# Patient Record
Sex: MALE | Race: WHITE | NOT HISPANIC OR LATINO | Employment: STUDENT | URBAN - METROPOLITAN AREA
[De-identification: names, ages, dates, MRNs, and addresses within clinical notes are randomized per-mention and may not be internally consistent; named-entity substitution may affect disease eponyms.]

---
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
## 2024-09-12 ENCOUNTER — OFFICE VISIT (OUTPATIENT)
Dept: FAMILY MEDICINE | Facility: CLINIC | Age: 24
End: 2024-09-12
Payer: COMMERCIAL

## 2024-09-12 VITALS
RESPIRATION RATE: 22 BRPM | DIASTOLIC BLOOD PRESSURE: 62 MMHG | BODY MASS INDEX: 26.24 KG/M2 | WEIGHT: 211 LBS | HEIGHT: 75 IN | TEMPERATURE: 96.8 F | SYSTOLIC BLOOD PRESSURE: 116 MMHG | HEART RATE: 78 BPM | OXYGEN SATURATION: 98 %

## 2024-09-12 DIAGNOSIS — Z11.4 SCREENING FOR HIV (HUMAN IMMUNODEFICIENCY VIRUS): Primary | ICD-10-CM

## 2024-09-12 DIAGNOSIS — Z00.00 ENCOUNTER FOR ANNUAL PHYSICAL EXAM: ICD-10-CM

## 2024-09-12 DIAGNOSIS — Z23 ENCOUNTER FOR IMMUNIZATION: ICD-10-CM

## 2024-09-12 PROCEDURE — 90471 IMMUNIZATION ADMIN: CPT

## 2024-09-12 PROCEDURE — 99385 PREV VISIT NEW AGE 18-39: CPT | Mod: 25

## 2024-09-12 PROCEDURE — 90656 IIV3 VACC NO PRSV 0.5 ML IM: CPT

## 2024-09-12 NOTE — PROGRESS NOTES
Preventive Care Visit  [facility]  Bautista Alarcon, SAMIRA, Nurse Practitioner Primary Care  Sep 12, 2024      Assessment & Plan     Encounter for immunization  - INFLUENZA VACCINE,SPLIT VIRUS,TRIVALENT,PF(FLUZONE)    Encounter for annual physical exam    Patient has been advised of split billing requirements and indicates understanding: Yes        Subjective   Yusef is a 24 year old, presenting for the following:  Physical  Graduated with degree in biology. 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  SO-Racquel-been together for 4 years. Monogamous arrangement. No children.    Diet: Bagel/coffee in am or protein bar. Lunch- depends on what time he gets up (maybe soup), pasta. Chicken/grains/vegetables (broccoli, stir pitts mixes). Bananas sometimes. Lactose-intolerant.  Exercise: Goes to gym, lifting between 5-7 days/week. He does 1 hour long lifting. Running at the end.Tries to run at 8-10 minutes.   Sleep: works second shift. Tries to get to sleep between [id number]. 8+ hours.        9/12/2024    10:22 AM   Additional Questions   Roomed by Eladia ALEXANDRE          HPI        9/12/2024   General Health   How would you rate your overall physical health? Good   Feel stress (tense, anxious, or unable to sleep) To some extent      (!) STRESS CONCERN      9/12/2024   Nutrition   Three or more servings of calcium each day? (!) NO   Diet: Other   If other, please elaborate: Dairy Free   How many servings of fruit and vegetables per day? (!) 2-3   How many sweetened beverages each day? 0-1            9/12/2024   Exercise   Days per week of moderate/strenous exercise 7 days            9/12/2024   Social Factors   Frequency of gathering with friends or relatives More than three times a week   Worry food won't last until get money to buy more No   Food not last or not have enough money for food? No   Do you have housing? (Housing is defined as stable permanent housing and does not include [facility]  "in a car, in a tent, in an abandoned building, in an overnight shelter, or couch-surfing.) Yes   Are you worried about losing your housing? No   Lack of transportation? No   Unable to get utilities (heat,electricity)? No            9/12/2024   Dental   Dentist two times every year? Yes   Eye doctor- have not been.        9/12/2024   TB Screening   Were you born outside of the [place]? No            Today's PHQ-2 Score:       9/12/2024    10:12 AM   PHQ-2 ( 1999 [employer])   Q1: Little interest or pleasure in doing things 0   Q2: Feeling down, depressed or hopeless 0   PHQ-2 Score 0   Q1: Little interest or pleasure in doing things Not at all   Q2: Feeling down, depressed or hopeless Not at all   PHQ-2 Score 0           9/12/2024   Substance Use   Alcohol more than 3/day or more than 7/wk No   Do you use any other substances recreationally? (!) CANNABIS PRODUCTS        Social History     Tobacco Use    Smoking status: Never             9/12/2024   One time HIV Screening   Previous HIV test? No          9/12/2024   STI Screening   New sexual partner(s) since last STI/HIV test? No            9/12/2024   Contraception/Family Planning   Questions about contraception or family planning No           Reviewed and updated as needed this visit by Provider                       Objective    Exam  /62 (BP Location: Right arm, Patient Position: Sitting, Cuff Size: Adult Regular)   Pulse 78   Temp 96.8  F (36  C) (Temporal)   Resp 22   Ht 1.816 m (5' 11.5\")   Wt 95.7 kg (211 lb)   SpO2 98%   BMI 29.02 kg/m     Estimated body mass index is 29.02 kg/m  as calculated from the following:    Height as of this encounter: 1.816 m (5' 11.5\").    Weight as of this encounter: 95.7 kg (211 lb).    Physical Exam  GENERAL: alert and no distress  EYES: Eyes grossly normal to inspection, PERRL and conjunctivae and sclerae normal  HENT: ear canals and TM's normal, nose and mouth without ulcers or lesions  NECK: no adenopathy, no asymmetry, " masses, or scars  RESP: lungs clear to auscultation - no rales, rhonchi or wheezes  CV: regular rate and rhythm, normal S1 S2, no S3 or S4, no murmur, click or rub, no peripheral edema  ABDOMEN: soft, nontender, no hepatosplenomegaly, no masses and bowel sounds normal   (male): normal male genitalia without lesions or urethral discharge, no hernia  MS: no gross musculoskeletal defects noted, no edema  SKIN: no suspicious lesions or rashes  NEURO: Normal strength and tone, mentation intact and speech normal  PSYCH: mentation appears normal, affect normal/bright        Signed Electronically by: Bautista Alarcon NP